# Patient Record
Sex: MALE | Race: WHITE | Employment: FULL TIME | URBAN - METROPOLITAN AREA
[De-identification: names, ages, dates, MRNs, and addresses within clinical notes are randomized per-mention and may not be internally consistent; named-entity substitution may affect disease eponyms.]

---

## 2021-10-12 ENCOUNTER — APPOINTMENT (OUTPATIENT)
Dept: GENERAL RADIOLOGY | Age: 36
End: 2021-10-12

## 2021-10-12 ENCOUNTER — HOSPITAL ENCOUNTER (EMERGENCY)
Age: 36
Discharge: HOME | End: 2021-10-12
Attending: EMERGENCY MEDICINE

## 2021-10-12 VITALS
WEIGHT: 225 LBS | DIASTOLIC BLOOD PRESSURE: 107 MMHG | RESPIRATION RATE: 16 BRPM | OXYGEN SATURATION: 98 % | BODY MASS INDEX: 30.48 KG/M2 | TEMPERATURE: 97.5 F | SYSTOLIC BLOOD PRESSURE: 154 MMHG | HEART RATE: 83 BPM | HEIGHT: 72 IN

## 2021-10-12 DIAGNOSIS — R03.0 ELEVATED BLOOD PRESSURE READING: ICD-10-CM

## 2021-10-12 DIAGNOSIS — M10.072 ACUTE IDIOPATHIC GOUT INVOLVING TOE OF LEFT FOOT: Primary | ICD-10-CM

## 2021-10-12 PROCEDURE — 6370000000 HC RX 637 (ALT 250 FOR IP): Performed by: EMERGENCY MEDICINE

## 2021-10-12 PROCEDURE — 99283 EMERGENCY DEPT VISIT LOW MDM: CPT

## 2021-10-12 PROCEDURE — 73630 X-RAY EXAM OF FOOT: CPT

## 2021-10-12 RX ORDER — NAPROXEN 500 MG/1
500 TABLET ORAL ONCE
Status: COMPLETED | OUTPATIENT
Start: 2021-10-12 | End: 2021-10-12

## 2021-10-12 RX ORDER — NAPROXEN 250 MG/1
250 TABLET ORAL 2 TIMES DAILY PRN
Qty: 20 TABLET | Refills: 0 | Status: SHIPPED | OUTPATIENT
Start: 2021-10-12

## 2021-10-12 RX ORDER — ONDANSETRON 4 MG/1
4 TABLET, ORALLY DISINTEGRATING ORAL EVERY 8 HOURS PRN
Qty: 20 TABLET | Refills: 0 | Status: SHIPPED | OUTPATIENT
Start: 2021-10-12

## 2021-10-12 RX ADMIN — NAPROXEN 500 MG: 500 TABLET ORAL at 09:10

## 2021-10-12 ASSESSMENT — PAIN SCALES - GENERAL
PAINLEVEL_OUTOF10: 8
PAINLEVEL_OUTOF10: 8

## 2021-10-12 ASSESSMENT — PAIN DESCRIPTION - PAIN TYPE: TYPE: ACUTE PAIN

## 2021-10-12 ASSESSMENT — PAIN DESCRIPTION - LOCATION: LOCATION: TOE (COMMENT WHICH ONE)

## 2021-10-12 ASSESSMENT — PAIN DESCRIPTION - ORIENTATION: ORIENTATION: LEFT

## 2021-10-12 NOTE — Clinical Note
Beverly Crooks was seen and treated in our emergency department on 10/12/2021. He may return to work on 10/13/2021. If you have any questions or concerns, please don't hesitate to call.       Nilo Black MD

## 2021-10-12 NOTE — Clinical Note
Brielle Crowe was seen and treated in our emergency department on 10/12/2021. He may return to work on 10/13/2021. If you have any questions or concerns, please don't hesitate to call.       Erica Heard MD

## 2021-10-12 NOTE — ED PROVIDER NOTES
[address] ED      CHIEF COMPLAINT  Toe Pain (Pt states left big toe pain for about 3-4 days. Pt states toe is red and swollen.)       HISTORY OF PRESENT ILLNESS  Cody Aleman is a 31 y.o. male  who presents to the ED complaining of left foot/toe swelling. Has been ongoing x4 days. Unknown if any injuries. Hard to put weight on it- painful. Mom/brother with hx gout and he thinks he has had an episode before. Walks a lot for work and is on his feet. No known fevers. No numbness, tingling, weakness. No aches/myalgias. Feels \"wore out. \"  Otherwise feels like his usual self. Has not taken anything for the pain. No other complaints, modifying factors or associated symptoms. I have reviewed the following from the nursing documentation. Past Medical History:   Diagnosis Date    Cancer ([facility]     Hodgkins disease    Lymphoma ([facility]     Lymphoma ([facility]     Pneumonia      Past Surgical History:   Procedure Laterality Date    BONE MARROW BIOPSY  11/6/12    LYMPH NODE BIOPSY  10/22/12    left neck    OTHER SURGICAL HISTORY      port-a-cath    power port insertion    OTHER SURGICAL HISTORY Left 08/23/2016    port removal     History reviewed. No pertinent family history.   Social History     Socioeconomic History    Marital status: Single     Spouse name: Not on file    Number of children: Not on file    Years of education: Not on file    Highest education level: Not on file   Occupational History    Not on file   Tobacco Use    Smoking status: Former Smoker     Packs/day: 0.10    Smokeless tobacco: Current User     Types: Snuff   Substance and Sexual Activity    Alcohol use: Yes     Comment: socially    Drug use: Yes     Types: Marijuana    Sexual activity: Yes   Other Topics Concern    Not on file   Social History Narrative    Not on file     Social Determinants of Health     Financial Resource Strain:     Difficulty of Paying Living Expenses:    Food Insecurity:     Worried About Running Out of Food in the Last Year:     Alvin of Food in the Last Year:    Transportation Needs:     Lack of Transportation (Medical):  Lack of Transportation (Non-Medical):    Physical Activity:     Days of Exercise per Week:     Minutes of Exercise per Session:    Stress:     Feeling of Stress :    Social Connections:     Frequency of Communication with Friends and Family:     Frequency of Social Gatherings with Friends and Family:     Attends Sikhism Services:     Active Member of Clubs or Organizations:     Attends Club or Organization Meetings:     Marital Status:    Intimate Partner Violence:     Fear of Current or Ex-Partner:     Emotionally Abused:     Physically Abused:     Sexually Abused:      No current facility-administered medications for this encounter. Current Outpatient Medications   Medication Sig Dispense Refill    naproxen (NAPROSYN) 250 MG tablet Take 1 tablet by mouth 2 times daily as needed for Pain Take with food 20 tablet 0    ondansetron (ZOFRAN ODT) 4 MG disintegrating tablet Take 1 tablet by mouth every 8 hours as needed for Nausea or Vomiting 20 tablet 0    albuterol sulfate HFA (VENTOLIN HFA) 108 (90 Base) MCG/ACT inhaler Inhale 2 puffs into the lungs every 6 hours as needed for Wheezing 1 Inhaler 0     Allergies   Allergen Reactions    Bee Venom     Ultram [Tramadol Hcl] Nausea Only    Aspirin Nausea And Vomiting    Ibuprofen Nausea And Vomiting    Naproxen Nausea And Vomiting       REVIEW OF SYSTEMS  10 systems reviewed, pertinent positives per HPI otherwise noted to be negative. PHYSICAL EXAM  BP (!) 154/107   Pulse 83   Temp 97.5 °F (36.4 °C) (Oral)   Resp 16   Ht 6' (1.829 m)   Wt 225 lb (102.1 kg)   SpO2 98%   BMI 30.52 kg/m²    GENERAL APPEARANCE: Awake and alert. Cooperative. No acute distress. HENT: Normocephalic. Atraumatic. Mucous membranes are moist.  No drooling or stridor. NECK: Supple.     EYES: PERRL. EOM's grossly intact. HEART/CHEST: RRR. No murmurs. 2+ DP and PT pulse in the left lower extremity. LUNGS: Respirations unlabored. CTAB. Good air exchange. Speaking comfortably in full sentences. ABDOMEN: No tenderness. Soft. Non-distended. No masses. No organomegaly. No guarding or rebound. MUSCULOSKELETAL: No extremity edema. Compartments soft. No deformity. Patient with tenderness, minimal soft tissue swelling, warmth and very faint erythema noted over the left first MTP joint. Distal motor and sensory intact. No erythema extending into the midfoot or ankle. No additional foot tenderness. No noted paronychia or evidence of fluctuance, crepitus or necrosis. .  All extremities neurovascularly intact. SKIN: Warm and dry. No acute rashes. NEUROLOGICAL: Alert and oriented. CN's 2-12 intact. No gross facial drooping. Strength 5/5, sensation intact. No gross focal deficits. PSYCHIATRIC: Normal mood and affect. LABS  I have reviewed all labs for this visit. No results found for this visit on 10/12/21. RADIOLOGY  XR FOOT LEFT (MIN 3 VIEWS)    Result Date: 10/12/2021  EXAMINATION: THREE XRAY VIEWS OF THE LEFT FOOT 10/12/2021 8:40 am COMPARISON: None. HISTORY: ORDERING SYSTEM PROVIDED HISTORY: left great toe/MTP joint pain/swelling redness. TECHNOLOGIST PROVIDED HISTORY: Reason for exam:->left great toe/MTP joint pain/swelling redness. Reason for Exam: Pain when walking for 4 days. No trauma. Acuity: Unknown Type of Exam: Initial FINDINGS: Soft tissue swelling medial to the 1st MTP joint. No fracture or destructive bone lesion. No erosion. No joint space loss. No bony or joint abnormality     Soft tissue swelling with no bony or joint abnormality demonstrated       ED COURSE/MDM  Patient seen and evaluated. Old records reviewed. imaging reviewed and results discussed with patient. Patient symptoms and history most consistent with acute gout flare.   No evidence of underlying fracture or abnormality on stable condition. Patient was given scripts for the following medications. I counseled patient how to take these medications. Discharge Medication List as of 10/12/2021  9:37 AM      START taking these medications    Details   naproxen (NAPROSYN) 250 MG tablet Take 1 tablet by mouth 2 times daily as needed for Pain Take with food, Disp-20 tablet, R-0Normal      ondansetron (ZOFRAN ODT) 4 MG disintegrating tablet Take 1 tablet by mouth every 8 hours as needed for Nausea or Vomiting, Disp-20 tablet, R-0Normal             Follow-up with:  [facility]) Pre-Services  [phone]  Schedule an appointment as soon as possible for a visit in 2 days  For recheck, To establish care with a primary care physician      DISCLAIMER: This chart was created using Dragon dictation software. Efforts were made by me to ensure accuracy, however some errors may be present due to limitations of this technology and occasionally words are not transcribed correctly.         Donald Juarez MD  10/12/21 4872

## 2021-12-08 ENCOUNTER — HOSPITAL ENCOUNTER (EMERGENCY)
Age: 36
Discharge: HOME | End: 2021-12-08
Payer: OTHER GOVERNMENT

## 2021-12-08 ENCOUNTER — APPOINTMENT (OUTPATIENT)
Dept: GENERAL RADIOLOGY | Age: 36
End: 2021-12-08
Payer: OTHER GOVERNMENT

## 2021-12-08 VITALS
HEART RATE: 94 BPM | DIASTOLIC BLOOD PRESSURE: 98 MMHG | SYSTOLIC BLOOD PRESSURE: 124 MMHG | WEIGHT: 250 LBS | BODY MASS INDEX: 33.86 KG/M2 | TEMPERATURE: 98.2 F | RESPIRATION RATE: 14 BRPM | HEIGHT: 72 IN | OXYGEN SATURATION: 98 %

## 2021-12-08 DIAGNOSIS — J12.82 PNEUMONIA DUE TO COVID-19 VIRUS: Primary | ICD-10-CM

## 2021-12-08 DIAGNOSIS — U07.1 PNEUMONIA DUE TO COVID-19 VIRUS: Primary | ICD-10-CM

## 2021-12-08 LAB
EKG ATRIAL RATE: 113 BPM
EKG DIAGNOSIS: NORMAL
EKG P AXIS: 67 DEGREES
EKG P-R INTERVAL: 130 MS
EKG Q-T INTERVAL: 342 MS
EKG QRS DURATION: 82 MS
EKG QTC CALCULATION (BAZETT): 469 MS
EKG R AXIS: 46 DEGREES
EKG T AXIS: 54 DEGREES
EKG VENTRICULAR RATE: 113 BPM
INFLUENZA A: NOT DETECTED
INFLUENZA B: NOT DETECTED
SARS-COV-2 RNA, RT PCR: DETECTED

## 2021-12-08 PROCEDURE — 93005 ELECTROCARDIOGRAM TRACING: CPT | Performed by: NURSE PRACTITIONER

## 2021-12-08 PROCEDURE — 71045 X-RAY EXAM CHEST 1 VIEW: CPT

## 2021-12-08 PROCEDURE — 87636 SARSCOV2 & INF A&B AMP PRB: CPT

## 2021-12-08 PROCEDURE — 99284 EMERGENCY DEPT VISIT MOD MDM: CPT

## 2021-12-08 PROCEDURE — 6370000000 HC RX 637 (ALT 250 FOR IP): Performed by: NURSE PRACTITIONER

## 2021-12-08 PROCEDURE — 93010 ELECTROCARDIOGRAM REPORT: CPT | Performed by: INTERNAL MEDICINE

## 2021-12-08 RX ORDER — DOXYCYCLINE HYCLATE 100 MG
100 TABLET ORAL ONCE
Status: COMPLETED | OUTPATIENT
Start: 2021-12-08 | End: 2021-12-08

## 2021-12-08 RX ORDER — BENZONATATE 100 MG/1
100 CAPSULE ORAL 3 TIMES DAILY PRN
Qty: 21 CAPSULE | Refills: 0 | Status: SHIPPED | OUTPATIENT
Start: 2021-12-08 | End: 2021-12-15

## 2021-12-08 RX ORDER — BENZONATATE 100 MG/1
100 CAPSULE ORAL ONCE
Status: COMPLETED | OUTPATIENT
Start: 2021-12-08 | End: 2021-12-08

## 2021-12-08 RX ORDER — DOXYCYCLINE HYCLATE 100 MG
100 TABLET ORAL 2 TIMES DAILY
Qty: 14 TABLET | Refills: 0 | Status: SHIPPED | OUTPATIENT
Start: 2021-12-08 | End: 2021-12-15

## 2021-12-08 RX ORDER — ALBUTEROL SULFATE 90 UG/1
AEROSOL, METERED RESPIRATORY (INHALATION)
Qty: 18 G | Refills: 1 | Status: SHIPPED | OUTPATIENT
Start: 2021-12-08

## 2021-12-08 RX ADMIN — BENZONATATE 100 MG: 100 CAPSULE ORAL at 12:39

## 2021-12-08 RX ADMIN — DOXYCYCLINE HYCLATE 100 MG: 100 TABLET, COATED ORAL at 12:39

## 2021-12-08 ASSESSMENT — PAIN SCALES - GENERAL
PAINLEVEL_OUTOF10: 9
PAINLEVEL_OUTOF10: 6

## 2021-12-08 ASSESSMENT — ENCOUNTER SYMPTOMS
COLOR CHANGE: 0
SORE THROAT: 0
COUGH: 1
SHORTNESS OF BREATH: 0
ABDOMINAL PAIN: 0
RHINORRHEA: 0

## 2021-12-08 ASSESSMENT — PAIN DESCRIPTION - LOCATION: LOCATION: GENERALIZED

## 2021-12-08 ASSESSMENT — PAIN DESCRIPTION - PAIN TYPE: TYPE: ACUTE PAIN

## 2021-12-08 NOTE — Clinical Note
Dedra Burton was seen and treated in our emergency department on 12/8/2021. He may return to work on 12/16/2021. If you have any questions or concerns, please don't hesitate to call.       Kristal Gottlieb, APRN - CNP

## 2021-12-08 NOTE — ED PROVIDER NOTES
Evaluated by [id number] [facility] Provider          [address] ED  EMERGENCY DEPARTMENT ENCOUNTER        Pt Name: Sherlyn Prescott  MRN: [id number]  Rakeshtrongfindia 1985  Dateof evaluation: 12/8/2021  Provider: BRUCE Hu CNP  PCP: No primary care provider on file. ED Attending: No att. providers found    [facility]       Chief Complaint   Patient presents with    Concern For COVID-19     chest congestion pain has been exposed to [place] at work states he has all the symptoms listed for COVID       HISTORY OF PRESENTILLNESS   (Location/Symptom, Timing/Onset, Context/Setting, Quality, Duration, Modifying Factors, Severity)  Note limiting factors. Sherlyn Prescott is a 31 y.o. male for body aches. Onset was 3 days. Context includes patient states that he was exposed to Covid at work and he has had increased body aches and a headache for the last couple of days. He denies any URI symptoms but does report a cough. He is unsure about a fever. Patient denies chest pain or shortness of breath. Alleviating factors include nothing. Aggravating factors include nothing. Pain is 9/10. Nothing has been used for pain today. Nursing Notes were all reviewed and agreed with or any disagreements were addressed  in the HPI. REVIEW OF SYSTEMS    (2-9 systems for level 4, 10 or more for level 5)     Review of Systems   Constitutional: Negative for fever. HENT: Negative for congestion, rhinorrhea and sore throat. Respiratory: Positive for cough. Negative for shortness of breath. Cardiovascular: Negative for chest pain. Gastrointestinal: Negative for abdominal pain. Genitourinary: Negative for decreased urine volume and difficulty urinating. Musculoskeletal: Negative for arthralgias and myalgias. Skin: Negative for color change and rash. Neurological: Negative for dizziness and light-headedness. Psychiatric/Behavioral: Negative for agitation.    All other systems reviewed and are negative. Positives and Pertinent negatives as per HPI. Except as noted above in the ROS, all other systems were reviewed and negative. PAST MEDICAL HISTORY     Past Medical History:   Diagnosis Date    Cancer ([facility]     Hodgkins disease    Lymphoma ([facility] [facility]     Lymphoma ([facility] [facility]     Pneumonia          SURGICAL HISTORY       Past Surgical History:   Procedure Laterality Date    BONE MARROW BIOPSY  11/6/12    LYMPH NODE BIOPSY  10/22/12    left neck    OTHER SURGICAL HISTORY      port-a-cath    power port insertion    OTHER SURGICAL HISTORY Left 08/23/2016    port removal         CURRENT MEDICATIONS       Previous Medications    NAPROXEN (NAPROSYN) 250 MG TABLET    Take 1 tablet by mouth 2 times daily as needed for Pain Take with food    ONDANSETRON (ZOFRAN ODT) 4 MG DISINTEGRATING TABLET    Take 1 tablet by mouth every 8 hours as needed for Nausea or Vomiting         ALLERGIES     Bee venom, Ultram [tramadol hcl], Aspirin, Ibuprofen, and Naproxen    FAMILY HISTORY     History reviewed. No pertinent family history. SOCIAL HISTORY       Social History     Socioeconomic History    Marital status: Single     Spouse name: None    Number of children: None    Years of education: None    Highest education level: None   Occupational History    None   Tobacco Use    Smoking status: Former Smoker     Packs/day: 0.10    Smokeless tobacco: Current User     Types: Snuff   Substance and Sexual Activity    Alcohol use: Yes     Comment: socially    Drug use: Yes     Types: Marijuana Delilah Paco)    Sexual activity: Yes   Other Topics Concern    None   Social History Narrative    None     Social Determinants of Health     Financial Resource Strain:     Difficulty of Paying Living Expenses: Not on file   Food Insecurity:     Worried About Running Out of Food in the Last Year: Not on file    Alvin of Food in the Last Year: Not on file   Transportation Needs:     Lack of Transportation (Medical):  Not on file    Lack of Transportation (Non-Medical): Not on file   Physical Activity:     Days of Exercise per Week: Not on file    Minutes of Exercise per Session: Not on file   Stress:     Feeling of Stress : Not on file   Social Connections:     Frequency of Communication with Friends and Family: Not on file    Frequency of Social Gatherings with Friends and Family: Not on file    Attends Anabaptist Services: Not on file    Active Member of [address] [place] [address] or Organizations: Not on file    Attends Club or Organization Meetings: Not on file    Marital Status: Not on file   Intimate Partner Violence:     Fear of Current or Ex-Partner: Not on file    Emotionally Abused: Not on file    Physically Abused: Not on file    Sexually Abused: Not on file   Housing Stability:     Unable to Pay for Housing in the Last Year: Not on file    Number of [place] in the Last Year: Not on file    Unstable Housing in the Last Year: Not on file       SCREENINGS    Fay Coma Scale  Eye Opening: Spontaneous  Best Verbal Response: Oriented  Best Motor Response: Obeys commands  Fay Coma Scale Score: 15        PHYSICAL EXAM  (up to 7 for level 4, 8 or more for level 5)     ED Triage Vitals [12/08/21 1026]   BP Temp Temp Source Pulse Resp SpO2 Height Weight   (!) 146/93 98.2 °F (36.8 °C) Tympanic 100 16 98 % 6' (1.829 m) 250 lb (113.4 kg)       Physical Exam  Constitutional:       Appearance: He is well-developed. HENT:      Head: Normocephalic and atraumatic. Cardiovascular:      Rate and Rhythm: Tachycardia present. Pulmonary:      Effort: Pulmonary effort is normal. No respiratory distress. Abdominal:      General: There is no distension. Palpations: Abdomen is soft. Tenderness: There is no abdominal tenderness. Musculoskeletal:         General: Normal range of motion. Cervical back: Normal range of motion. Skin:     General: Skin is warm and dry.    Neurological:      Mental Status: He is alert and oriented to person, place, and time. DIAGNOSTIC RESULTS   LABS:    Labs Reviewed   COVID-19 & INFLUENZA COMBO - Abnormal; Notable for the following components:       Result Value    SARS-CoV-2 RNA, RT PCR DETECTED (*)     All other components within normal limits    Narrative:     Paulette Osler SCED tel. [phone],  Microbiology results called to and read back by Darci Damon RN, 12/08/2021  11:05, by Milagro Prince  Performed at:  [facility],  [place], [facility]   Phone [phone]       All other labs werewithin normal range or not returned as of this dictation. EKG: All EKG's are interpreted by the Emergency Department Physician who either signs or Co-signs this chart in the absence of a cardiologist.  Please see their note for interpretation of EKG. RADIOLOGY:     Portable chest x-ray interpreted by radiologist for  Impression:    Bilateral linear pulmonary opacities suggesting COVID pneumonia in this COVID   positive patient             Interpretation per the Radiologist below, if available at the time of this note:    XR CHEST PORTABLE   Final Result   Bilateral linear pulmonary opacities suggesting COVID pneumonia in this COVID   positive patient           XR CHEST PORTABLE    Result Date: 12/8/2021  EXAMINATION: ONE XRAY VIEW OF THE CHEST 12/8/2021 11:52 am COMPARISON: 02/20/2018 HISTORY: ORDERING SYSTEM PROVIDED HISTORY: covid TECHNOLOGIST PROVIDED HISTORY: Reason for exam:->covid FINDINGS: Cardiomediastinal silhouette stable. Bilateral ill-defined linear opacities. No pneumothorax. No effusion.      Bilateral linear pulmonary opacities suggesting COVID pneumonia in this COVID positive patient         PROCEDURES   Unless otherwise noted below, none     Procedures     CRITICAL CARE TIME   N/A    CONSULTS:  None      EMERGENCYDEPARTMENT COURSE and DIFFERENTIAL DIAGNOSIS/MDM:   Vitals:    Vitals:    12/08/21 1026 12/08/21 1205 12/08/21 1213 12/08/21 1219   BP: (!) 146/93 (!) 143/104     Pulse: 100 103 97 99   Resp: 16 17 16 17   Temp: 98.2 °F (36.8 °C)      TempSrc: Tympanic      SpO2: 98% 96% 98% 97%   Weight: 250 lb (113.4 kg)      Height: 6' (1.829 m)          Patient was given the following medications:  Medications   doxycycline hyclate (VIBRA-TABS) tablet 100 mg (100 mg Oral Given 12/8/21 1239)   benzonatate (TESSALON) capsule 100 mg (100 mg Oral Given 12/8/21 1239)       Patient was seen and evaluated by myself. Patient here for concerns for generalized body aches and a cough. Patient reports he has had symptoms for the last 3 days. He reports that multiple people at work have been sick with [address]. On exam he is awake and alert. Patient did have a heart rate that was in the low 100s however it has improved into the 90s while here. Patient was able to take oral intake without difficulty. Patient did have a positive Covid his chest x-ray was concerning for Covid pneumonia. Patient will be treated with doxycycline and Tessalon Perles. He was given doses in the ED. He was given prescriptions for home use and encouraged to follow-up and establish care with PCP that was provided. Patient was able to ambulate without any desaturations, shortness of breath, or chest pain. Patient will be given instructions to return the ED for worsening symptoms. Patient was ultimately discharged with all questions answered. The patient tolerated their visit well. I have evaluated this patient. My supervising physician was available for consultation. The patient and / or the family were informed of the results of any tests, a time was given to answer questions, a plan was proposed and they agreed with plan. FINAL IMPRESSION      1.  Pneumonia due to COVID-19 virus          DISPOSITION/PLAN   DISPOSITION        PATIENT REFERRED TO:  [facility]) Pre-Services  [phone]  Schedule an appointment as soon as possible for a visit in 1 week  to establish primary care    [facility]  [address] [facility]  [phone]    If symptoms worsen      DISCHARGE MEDICATIONS:  New Prescriptions    ALBUTEROL SULFATE HFA (PROAIR HFA) 108 (90 BASE) MCG/ACT INHALER    Use 4 puffs every 4 hours while awake for 7-10 days then PRN wheezing  Dispense with SPACER and Instruct on use. May sub Ventolin or Proventil as needed per [employer].     BENZONATATE (TESSALON PERLES) 100 MG CAPSULE    Take 1 capsule by mouth 3 times daily as needed for Cough    DOXYCYCLINE HYCLATE (VIBRA-TABS) 100 MG TABLET    Take 1 tablet by mouth 2 times daily for 7 days       DISCONTINUED MEDICATIONS:  Discontinued Medications    ALBUTEROL SULFATE HFA (VENTOLIN HFA) 108 (90 BASE) MCG/ACT INHALER    Inhale 2 puffs into the lungs every 6 hours as needed for Wheezing              (Please note that portions of this note were completed with a voice recognition program.  Efforts were made to edit the dictations but occasionally words are mis-transcribed.)    BRUCE Yi CNP (electronically signed)         BRUCE Yi CNP  12/08/21 [facility] Crigler, APRN - CNP  12/08/21 [facility] [address], [address] [facility], APRN - CNP  12/08/21 1303

## 2021-12-08 NOTE — ED PROVIDER NOTES
ECG    The Ekg interpreted by me shows sinus tachycardia with a rate of 113 bpm.  Normal axis. No acute injury pattern. , QRS 82, QTc 469.      Ilsa Ladd DO  12/08/21 1812

## 2021-12-08 NOTE — Clinical Note
Nathalia Cardenas was seen and treated in our emergency department on 12/8/2021. He may return to work on 12/16/2021. If you have any questions or concerns, please don't hesitate to call.       Melecio Carbajal, APRN - CNP

## 2021-12-09 ENCOUNTER — CARE COORDINATION (OUTPATIENT)
Dept: CARE COORDINATION | Age: 36
End: 2021-12-09

## 2022-04-01 ENCOUNTER — APPOINTMENT (OUTPATIENT)
Dept: GENERAL RADIOLOGY | Age: 37
End: 2022-04-01

## 2022-04-01 ENCOUNTER — HOSPITAL ENCOUNTER (EMERGENCY)
Age: 37
Discharge: HOME | End: 2022-04-01
Attending: EMERGENCY MEDICINE

## 2022-04-01 VITALS
BODY MASS INDEX: 33.18 KG/M2 | WEIGHT: 245 LBS | HEIGHT: 72 IN | RESPIRATION RATE: 16 BRPM | HEART RATE: 100 BPM | OXYGEN SATURATION: 100 % | TEMPERATURE: 98 F | SYSTOLIC BLOOD PRESSURE: 170 MMHG | DIASTOLIC BLOOD PRESSURE: 122 MMHG

## 2022-04-01 DIAGNOSIS — M10.9 ACUTE GOUT OF LEFT FOOT, UNSPECIFIED CAUSE: Primary | ICD-10-CM

## 2022-04-01 PROCEDURE — 99283 EMERGENCY DEPT VISIT LOW MDM: CPT

## 2022-04-01 PROCEDURE — 6370000000 HC RX 637 (ALT 250 FOR IP): Performed by: EMERGENCY MEDICINE

## 2022-04-01 PROCEDURE — 73630 X-RAY EXAM OF FOOT: CPT

## 2022-04-01 RX ORDER — PREDNISONE 20 MG/1
60 TABLET ORAL ONCE
Status: COMPLETED | OUTPATIENT
Start: 2022-04-01 | End: 2022-04-01

## 2022-04-01 RX ORDER — INDOMETHACIN 50 MG/1
50 CAPSULE ORAL 3 TIMES DAILY
Qty: 21 CAPSULE | Refills: 0 | Status: SHIPPED | OUTPATIENT
Start: 2022-04-01 | End: 2022-04-08

## 2022-04-01 RX ORDER — PREDNISONE 20 MG/1
60 TABLET ORAL DAILY
Qty: 12 TABLET | Refills: 0 | Status: SHIPPED | OUTPATIENT
Start: 2022-04-01 | End: 2022-04-05

## 2022-04-01 RX ORDER — INDOMETHACIN 25 MG/1
50 CAPSULE ORAL ONCE
Status: COMPLETED | OUTPATIENT
Start: 2022-04-01 | End: 2022-04-01

## 2022-04-01 RX ADMIN — PREDNISONE 60 MG: 20 TABLET ORAL at 11:26

## 2022-04-01 RX ADMIN — INDOMETHACIN 50 MG: 25 CAPSULE ORAL at 11:26

## 2022-04-01 ASSESSMENT — ENCOUNTER SYMPTOMS
TROUBLE SWALLOWING: 0
SHORTNESS OF BREATH: 0
WHEEZING: 0
VOICE CHANGE: 0

## 2022-04-01 ASSESSMENT — PAIN - FUNCTIONAL ASSESSMENT: PAIN_FUNCTIONAL_ASSESSMENT: 0-10

## 2022-04-01 ASSESSMENT — PAIN DESCRIPTION - LOCATION: LOCATION: FOOT

## 2022-04-01 ASSESSMENT — PAIN DESCRIPTION - ORIENTATION: ORIENTATION: RIGHT

## 2022-04-01 ASSESSMENT — PAIN SCALES - GENERAL: PAINLEVEL_OUTOF10: 10

## 2022-04-01 NOTE — Clinical Note
Mackenzie Dmitriy was seen and treated in our emergency department on 4/1/2022. He may return to work on 04/04/2022. If you have any questions or concerns, please don't hesitate to call.       Ro Hutchins MD

## 2022-04-01 NOTE — ED PROVIDER NOTES
[address] ED  eMERGENCY dEPARTMENT eNCOUnter      Pt Name: Gabriela Almanzar  MRN: [id number]  [place] 1985  Date of evaluation: 4/1/2022  Provider: Yuli Lama MD    [address] [place] [address]       Chief Complaint   Patient presents with    Foot Pain     left foot; since yesterday; denies injury; has had gout in past         HISTORY OF PRESENT ILLNESS   (Location/Symptom, Timing/Onset, Context/Setting, Quality, Duration, Modifying Factors, Severity)  Note limiting factors. Gabriela Almanzar is a 31 y.o. male reports history of gout who reports left foot pain and swelling starting yesterday consistent with previous gout flares. Patient denies any fever or rapid spreading redness. Patient denies any trauma to the foot. Patient denies any history of septic arthritis or soft tissue infections requiring IV antibiotics or hospitalization. Patient reports his pain is severe constant worsening. He reports light tactile pressure such as blankets or sheets worsens his pain and nothing improves it. HPI    Nursing Notes were reviewed. REVIEW OFSYSTEMS    (2-9 systems for level 4, 10 or more for level 5)     Review of Systems   Constitutional: Negative for fever. HENT: Negative for drooling, trouble swallowing and voice change. Eyes: Negative for visual disturbance. Respiratory: Negative for shortness of breath and wheezing. Cardiovascular: Negative for chest pain and palpitations. Musculoskeletal: Positive for arthralgias. Neurological: Negative for seizures and syncope. Psychiatric/Behavioral: Negative for self-injury and suicidal ideas. Except as noted above the remainder of the review of systems was reviewed and negative.        PAST MEDICAL HISTORY     Past Medical History:   Diagnosis Date    Cancer [facility])     Hodgkins disease    [facility] [address]     Lymphoma ([facility] [address]     Pneumonia          SURGICAL HISTORY       Past Surgical History:   Procedure Laterality Date    BONE MARROW BIOPSY  11/6/12    LYMPH NODE BIOPSY  10/22/12    left neck    OTHER SURGICAL HISTORY      port-a-cath    power port insertion    OTHER SURGICAL HISTORY Left 08/23/2016    port removal         CURRENT MEDICATIONS       Previous Medications    ALBUTEROL SULFATE HFA (PROAIR HFA) 108 (90 BASE) MCG/ACT INHALER    Use 4 puffs every 4 hours while awake for 7-10 days then PRN wheezing  Dispense with SPACER and Instruct on use. May sub Ventolin or Proventil as needed per [employer]. NAPROXEN (NAPROSYN) 250 MG TABLET    Take 1 tablet by mouth 2 times daily as needed for Pain Take with food    ONDANSETRON (ZOFRAN ODT) 4 MG DISINTEGRATING TABLET    Take 1 tablet by mouth every 8 hours as needed for Nausea or Vomiting       ALLERGIES     Bee venom, Ultram [tramadol hcl], Aspirin, Ibuprofen, and Naproxen    FAMILY HISTORY     No family history on file. SOCIAL HISTORY       Social History     Socioeconomic History    Marital status: Single     Spouse name: Not on file    Number of children: Not on file    Years of education: Not on file    Highest education level: Not on file   Occupational History    Not on file   Tobacco Use    Smoking status: Former Smoker     Packs/day: 0.10    Smokeless tobacco: Current User     Types: Snuff   Vaping Use    Vaping Use: Never used   Substance and Sexual Activity    Alcohol use: Not Currently     Comment: socially    Drug use: Yes     Types: Marijuana Curlie Opal)     Comment: occ    Sexual activity: Yes   Other Topics Concern    Not on file   Social History Narrative    Not on file     Social Determinants of Health     Financial Resource Strain:     Difficulty of Paying Living Expenses: Not on file   Food Insecurity:     Worried About Running Out of Food in the Last Year: Not on file    Alvin of Food in the Last Year: Not on file   Transportation Needs:     Lack of Transportation (Medical): Not on file    Lack of Transportation (Non-Medical):  Not on file   Physical Activity:     Days of Exercise per Week: Not on file    Minutes of Exercise per Session: Not on file   Stress:     Feeling of Stress : Not on file   Social Connections:     Frequency of Communication with Friends and Family: Not on file    Frequency of Social Gatherings with Friends and Family: Not on file    Attends Congregation Services: Not on file    Active Member of [address] [place] [address] or Organizations: Not on file    Attends Club or Organization Meetings: Not on file    Marital Status: Not on file   Intimate Partner Violence:     Fear of Current or Ex-Partner: Not on file    Emotionally Abused: Not on file    Physically Abused: Not on file    Sexually Abused: Not on file   Housing Stability:     Unable to Pay for Housing in the Last Year: Not on file    Number of [place] in the Last Year: Not on file    Unstable Housing in the Last Year: Not on file         PHYSICAL EXAM    (up to 7 for level 4, 8 or more for level 5)     ED Triage Vitals   BP Temp Temp Source Pulse Resp SpO2 Height Weight   04/01/22 0932 04/01/22 0932 04/01/22 0932 04/01/22 0932 04/01/22 0932 04/01/22 0932 04/01/22 0935 04/01/22 0935   (!) 170/122 98 °F (36.7 °C) Oral 100 16 100 % 6' (1.829 m) 245 lb (111.1 kg)       Physical Exam  Vitals and nursing note reviewed. Constitutional:       General: He is not in acute distress. Appearance: He is well-developed. HENT:      Head: Normocephalic and atraumatic. Eyes:      Conjunctiva/sclera: Conjunctivae normal.   Neck:      Vascular: No JVD. Trachea: No tracheal deviation. Cardiovascular:      Rate and Rhythm: Normal rate. Pulses: Normal pulses. Comments: 2+ DP and PT pulses to left lower extremity. Normal cap refill to toes of left foot  Pulmonary:      Effort: Pulmonary effort is normal. No respiratory distress. Musculoskeletal:         General: Swelling and tenderness present.       Comments: Tenderness and swelling to left foot most prominent in left first metatarsal.  No palpable bone deformity. No red streaking or beefy red induration. Skin:     General: Skin is warm and dry. Neurological:      Mental Status: He is alert. Comments: Sensation intact all nerve distributions of left lower extremity. DIAGNOSTIC RESULTS       RADIOLOGY:     Interpretation per the Radiologist below, if available at the time of this note:    XR FOOT LEFT (MIN 3 VIEWS)   Final Result   Gouty arthritis               EMERGENCY DEPARTMENT COURSE and DIFFERENTIAL DIAGNOSIS/MDM:   Vitals:    Vitals:    04/01/22 0932 04/01/22 0935   BP: (!) 170/122    Pulse: 100    Resp: 16    Temp: 98 °F (36.7 °C)    TempSrc: Oral    SpO2: 100%    Weight:  245 lb (111.1 kg)   Height:  6' (1.829 m)         MDM  Patient with borderline tachycardia however heart rate does normalize to 90s upon my exam.  He is neurovascularly intact. History physical exam and imaging are all consistent with gouty arthritis. I feel patient is appropriate for indomethacin, prednisone, diet instructions such as staying well-hydrated and avoiding alcohol and red meats, and outpatient primary care follow-up. We discussed the possibility of infection and need for arthrocentesis and antibiotics however the patient denies previous history of infection reports his symptoms are consistent with previous gout flares. Feel he is appropriate for anti-inflammatories and standard gout treatment at this time however very strict ER return precautions are given for fever, spreading redness, worsening of symptoms. Patient expresses understanding and agreement with this plan and is discharged home. I estimate there is low risk for COMPARTMENT SYNDROME, DEEP VENOUS THROMBOSIS, SEPTIC ARTHRITIS, TENDON OR NEUROVASCULAR INJURY, thus I consider the discharge disposition reasonable.  The patient and I have discussed the diagnosis and risks, and we agree with discharging home to follow-up with their primary doctor or the referral orthopedist. We also discussed returning to the Emergency Department immediately if new or worsening symptoms occur. We have discussed the symptoms which are most concerning (e.g., changing or worsening pain, numbness, weakness) that necessitate immediate return       Procedures    FINAL IMPRESSION      1. Acute gout of left foot, unspecified cause          DISPOSITION/PLAN   DISPOSITION Decision To Discharge 04/01/2022 11:12:10 AM      PATIENT REFERRED TO:  Migel Tinoco  [phone]  In 3 days  Ask for an appointment with a primary care doctor    [facility] [place] [facility]  [address] [facility]  [phone]    If symptoms worsen      DISCHARGE MEDICATIONS:  New Prescriptions    INDOMETHACIN (INDOCIN) 50 MG CAPSULE    Take 1 capsule by mouth 3 times daily for 7 days    PREDNISONE (DELTASONE) 20 MG TABLET    Take 3 tablets by mouth daily for 4 days          (Please note that portions of this note were completed with a voice recognition program.  Efforts were made to edit the dictations but occasionally words aremis-transcribed. )    Jason Flores MD (electronically signed)  Attending Emergency Physician           Jason Flores MD  04/01/22 6537

## 2022-09-12 ENCOUNTER — APPOINTMENT (OUTPATIENT)
Dept: GENERAL RADIOLOGY | Age: 37
End: 2022-09-12

## 2022-09-12 ENCOUNTER — HOSPITAL ENCOUNTER (EMERGENCY)
Age: 37
Discharge: HOME | End: 2022-09-12
Attending: STUDENT IN AN ORGANIZED HEALTH CARE EDUCATION/TRAINING PROGRAM

## 2022-09-12 VITALS
TEMPERATURE: 97.4 F | RESPIRATION RATE: 16 BRPM | BODY MASS INDEX: 33.86 KG/M2 | WEIGHT: 250 LBS | DIASTOLIC BLOOD PRESSURE: 88 MMHG | OXYGEN SATURATION: 98 % | HEIGHT: 72 IN | HEART RATE: 80 BPM | SYSTOLIC BLOOD PRESSURE: 149 MMHG

## 2022-09-12 DIAGNOSIS — M79.671 RIGHT FOOT PAIN: ICD-10-CM

## 2022-09-12 DIAGNOSIS — S93.401A SPRAIN OF RIGHT ANKLE, UNSPECIFIED LIGAMENT, INITIAL ENCOUNTER: Primary | ICD-10-CM

## 2022-09-12 PROCEDURE — 73630 X-RAY EXAM OF FOOT: CPT

## 2022-09-12 PROCEDURE — 99283 EMERGENCY DEPT VISIT LOW MDM: CPT

## 2022-09-12 PROCEDURE — 73610 X-RAY EXAM OF ANKLE: CPT

## 2022-09-12 PROCEDURE — 6370000000 HC RX 637 (ALT 250 FOR IP): Performed by: STUDENT IN AN ORGANIZED HEALTH CARE EDUCATION/TRAINING PROGRAM

## 2022-09-12 RX ORDER — ACETAMINOPHEN 500 MG
1000 TABLET ORAL ONCE
Status: COMPLETED | OUTPATIENT
Start: 2022-09-12 | End: 2022-09-12

## 2022-09-12 RX ORDER — LIDOCAINE 4 G/G
1 PATCH TOPICAL ONCE
Status: DISCONTINUED | OUTPATIENT
Start: 2022-09-12 | End: 2022-09-12 | Stop reason: HOSPADM

## 2022-09-12 RX ADMIN — ACETAMINOPHEN 1000 MG: 500 TABLET ORAL at 09:11

## 2022-09-12 ASSESSMENT — PAIN - FUNCTIONAL ASSESSMENT: PAIN_FUNCTIONAL_ASSESSMENT: 0-10

## 2022-09-12 ASSESSMENT — PAIN DESCRIPTION - ORIENTATION: ORIENTATION: RIGHT

## 2022-09-12 ASSESSMENT — PAIN SCALES - GENERAL
PAINLEVEL_OUTOF10: 7
PAINLEVEL_OUTOF10: 5

## 2022-09-12 ASSESSMENT — PAIN DESCRIPTION - LOCATION: LOCATION: ANKLE

## 2022-09-12 NOTE — ED PROVIDER NOTES
[address] ED      CHIEF COMPLAINT  Foot and ankle injury       HISTORY OF PRESENT ILLNESS  Arlet Vargas is a 30 y.o. male with a past medical history of gout, Hodgkin's lymphoma, who presents to the ED complaining of right foot pain. Jame Hammer complains of injury to the right foot, that occurred 6 day(s) ago, after mechanical fall (slipped on wet ground). The pain is moderate and constant, sharp, worse with walking, improves with aleve and soaking in hot water, and does not radiate. Other injuries, abrasions and lacerations are Absent. Lower extremity edema, coolness, and weakness are Absent. Numbness and tingling are Absent. No head injury, no LOC, not on blood thinners, no vomiting. Denies other injuries. Old records reviewed:  Has been seen for gout before- this feels different    No other complaints, modifying factors or associated symptoms. I have reviewed the following from the nursing documentation. Past Medical History:   Diagnosis Date    Cancer ([facility] [address]     Hodgkins disease    Lymphoma ([facility] [address]     Lymphoma ([facility] [address]     Pneumonia      Past Surgical History:   Procedure Laterality Date    BONE MARROW BIOPSY  11/6/12    LYMPH NODE BIOPSY  10/22/12    left neck    OTHER SURGICAL HISTORY      port-a-cath    power port insertion    OTHER SURGICAL HISTORY Left 08/23/2016    port removal     History reviewed. No pertinent family history.   Social History     Socioeconomic History    Marital status: Single     Spouse name: Not on file    Number of children: Not on file    Years of education: Not on file    Highest education level: Not on file   Occupational History    Not on file   Tobacco Use    Smoking status: Former     Packs/day: 0.10     Types: Cigarettes    Smokeless tobacco: Current     Types: Snuff   Vaping Use    Vaping Use: Never used   Substance and Sexual Activity    Alcohol use: Not Currently     Comment: socially    Drug use: Yes     Types: Marijuana Alison Kailash) Comment: occ    Sexual activity: Yes   Other Topics Concern    Not on file   Social History Narrative    Not on file     Social Determinants of Health     Financial Resource Strain: Not on file   Food Insecurity: Not on file   Transportation Needs: Not on file   Physical Activity: Not on file   Stress: Not on file   Social Connections: Not on file   Intimate Partner Violence: Not on file   Housing Stability: Not on file     Current Facility-Administered Medications   Medication Dose Route Frequency Provider Last Rate Last Admin    lidocaine 4 % external patch 1 patch  1 patch TransDERmal Once Joyce Jacobs MD   1 patch at 09/12/22 0911     Current Outpatient Medications   Medication Sig Dispense Refill    indomethacin (INDOCIN) 50 MG capsule Take 1 capsule by mouth 3 times daily for 7 days 21 capsule 0    albuterol sulfate HFA (PROAIR HFA) 108 (90 Base) MCG/ACT inhaler Use 4 puffs every 4 hours while awake for 7-10 days then PRN wheezing  Dispense with SPACER and Instruct on use. May sub Ventolin or Proventil as needed per [employer]. 18 g 1    naproxen (NAPROSYN) 250 MG tablet Take 1 tablet by mouth 2 times daily as needed for Pain Take with food 20 tablet 0    ondansetron (ZOFRAN ODT) 4 MG disintegrating tablet Take 1 tablet by mouth every 8 hours as needed for Nausea or Vomiting 20 tablet 0     Allergies   Allergen Reactions    Bee Venom     Ultram [Tramadol Hcl] Nausea Only    Aspirin Nausea And Vomiting    Ibuprofen Nausea And Vomiting    Naproxen Nausea And Vomiting       REVIEW OF SYSTEMS  All systems reviewed, pertinent positives per HPI otherwise noted to be negative. PHYSICAL EXAM  BP (!) 155/92   Pulse 83   Temp 97.5 °F (36.4 °C) (Oral)   Resp 18   Ht 6' (1.829 m)   Wt 250 lb (113.4 kg)   SpO2 98%   BMI 33.91 kg/m²    GENERAL APPEARANCE: Awake and alert. Cooperative. no distress. HENT: Normocephalic. Atraumatic.  Mucous membranes are moist.  No septal hematoma, blood in the oropharynx, or hemotympanums  NECK: Supple. No cervical spine tenderness to palpation  EYES: PERRL. EOM's grossly intact. HEART/CHEST: RRR. No murmurs. LUNGS: Respirations unlabored. CTAB. Good air exchange. Speaking comfortably in full sentences. ABDOMEN: No tenderness. Soft. Non-distended. No masses. No organomegaly. No guarding or rebound. MUSCULOSKELETAL: right Medial malleolus is not tender to palpation. Lateral malleolus is  tender to palpation. Top of foot is tender to palpation. 5th metatarsal head is not tender to palpation. Proximal fibula is not tender to palpation. Patella is not tender to palpation. The calves are not tender to palpation. Active range of motion of the joints without ligamentous laxity. There is no obvious joint or bony deformity. negative joint effusions. NEUROLOGICAL: Awake, alert and oriented x 3. Power intact at the knees, ankles, and toes. Sensation is intact to light touch in the lower extremities. IMMUNOLOGICAL: No palpable lymphadenopathy or lymphatic streaking. DERMATOLOGIC: No petechiae or rashes. There are not ecchymoses. Theres no cyanosis, erythema, or pallor. There is no edema. Skin temperature is normal. No lacerations or abrasions. No evidence of foreign bodies. PSYCHIATRIC: Normal mood and affect. LABS  I have reviewed all labs for this visit. No results found for this visit on 09/12/22. RADIOLOGY  The following were independently interpreted by me and on call radiologist: Extremities: Foot: Negative  Ankle: no abnormality. If performed, all other non-plain film images (e.g., CT Scan, Ultrasound) have been interpreted by the on-call radiologist.    XR ANKLE RIGHT (MIN 3 VIEWS)   Final Result   Possible ankle sprains. RECOMMENDATION:   Noncontrast ankle MRI if symptoms persist.         XR FOOT RIGHT (MIN 3 VIEWS)   Final Result   No acute findings. ED COURSE / MDM  Patient seen and evaluated.  Old records reviewed and pertinent information included in HPI. Labs and imaging reviewed and results discussed with patient. Overall well appearing patient, in no acute distress, presenting for right foot and ankle injury after mechanical fall. Physical exam remarkable for tenderness to palpation of the right ankle and foot. Differential diagnosis includes but is not limited to: Fracture, muscular strain, ligamentous sprain, joint effusion, lower suspicion for gout, septic arthritis    Workup showed:    ED Course as of 09/12/22 0950   Mon Sep 12, 2022   0920 XR R ankle: IMPRESSION:  Possible ankle sprains. [ER]   9281 X-ray of right foot shows no evidence of fracture or dislocation [ER]      ED Course User Index  [ER] Uriel Kaplan MD      Is this patient to be included in the SEP-1 Core Measure due to severe sepsis or septic shock? No   Exclusion criteria - the patient is NOT to be included for SEP-1 Core Measure due to: Infection is not suspected    During the patient's ED course, the patient was given:  Medications   lidocaine 4 % external patch 1 patch (1 patch TransDERmal Patch Applied 9/12/22 0911)   acetaminophen (TYLENOL) tablet 1,000 mg (1,000 mg Oral Given 9/12/22 0911)      X-ray showed evidence of possible ankle sprains. No evidence of fracture or dislocation in the foot or ankle. Patient desires to continue using Ace wrap and crutches from home. Patient given referral to orthopedics. Compartments are soft, low suspicion for compartment syndrome. History and exam not consistent with DVT. No evidence of neurovascular injury on exam. No warmth and erythema over the joint. No swelling. No pain with micro-movements. Low suspicion for septic arthritis or gout. Work-up overall reassuring. At this time, feel the patient is appropriate for discharge to follow-up with a primary care doctor. Patient feels comfortable with discharge at this time. Return precautions given.   Encouraged PCP follow-up in 2 days, orthopedic follow-up as soon as possible. Patient discharged in stable condition. I estimate there is LOW risk for COMPARTMENT SYNDROME, DEEP VENOUS THROMBOSIS, TENDON OR NEUROVASCULAR INJURY, thus I consider the discharge disposition reasonable. Gato Wren and I have discussed the diagnosis and risks, and we agree with discharging home to follow-up with their primary doctor or the referral orthopedist. We also discussed returning to the Emergency Department immediately if new or worsening symptoms occur. We have discussed the symptoms which are most concerning (e.g., changing or worsening pain, numbness, weakness) that necessitate immediate return. CLINICAL IMPRESSION  1. Sprain of right ankle, unspecified ligament, initial encounter    2. Right foot pain        Blood pressure (!) 149/88, pulse 80, temperature 97.4 °F (36.3 °C), resp. rate 16, height 6' (1.829 m), weight 250 lb (113.4 kg), SpO2 98 %. Felix Lopez was discharged to home in stable condition. Patient was given scripts for the following medications. I counseled patient how to take these medications. Discharge Medication List as of 9/12/2022  9:37 AM          Follow-up with:  Sebastian Carter MD  [place] [address]  [phone]    Schedule an appointment as soon as possible for a visit       [facility]) Pre-Services  [phone]  Schedule an appointment as soon as possible for a visit       [facility] [facility]  [address] [facility]  [phone]  Go to   As needed, If symptoms worsen    DISCLAIMER: This chart was created using Dragon dictation software. Efforts were made by me to ensure accuracy, however some errors may be present due to limitations of this technology and occasionally words are not transcribed correctly.         Hardik Rueda MD  09/12/22 0916

## 2022-09-12 NOTE — Clinical Note
Lisa Alysha was seen and treated in our emergency department on 9/12/2022. He may return to work on 09/15/2022. If you have any questions or concerns, please don't hesitate to call.       Bindu Smith MD

## 2023-03-21 ENCOUNTER — HOSPITAL ENCOUNTER (EMERGENCY)
Age: 38
Discharge: HOME | End: 2023-03-21
Attending: STUDENT IN AN ORGANIZED HEALTH CARE EDUCATION/TRAINING PROGRAM
Payer: COMMERCIAL

## 2023-03-21 ENCOUNTER — APPOINTMENT (OUTPATIENT)
Dept: GENERAL RADIOLOGY | Age: 38
End: 2023-03-21
Payer: COMMERCIAL

## 2023-03-21 VITALS
WEIGHT: 250 LBS | HEIGHT: 72 IN | DIASTOLIC BLOOD PRESSURE: 104 MMHG | TEMPERATURE: 97.7 F | RESPIRATION RATE: 16 BRPM | BODY MASS INDEX: 33.86 KG/M2 | OXYGEN SATURATION: 97 % | HEART RATE: 91 BPM | SYSTOLIC BLOOD PRESSURE: 151 MMHG

## 2023-03-21 DIAGNOSIS — R03.0 ELEVATED BLOOD PRESSURE READING: ICD-10-CM

## 2023-03-21 DIAGNOSIS — M25.561 ACUTE PAIN OF RIGHT KNEE: ICD-10-CM

## 2023-03-21 DIAGNOSIS — M79.671 ACUTE PAIN OF RIGHT FOOT: ICD-10-CM

## 2023-03-21 DIAGNOSIS — M25.571 ACUTE RIGHT ANKLE PAIN: Primary | ICD-10-CM

## 2023-03-21 PROCEDURE — 73610 X-RAY EXAM OF ANKLE: CPT

## 2023-03-21 PROCEDURE — 96372 THER/PROPH/DIAG INJ SC/IM: CPT

## 2023-03-21 PROCEDURE — 6360000002 HC RX W HCPCS: Performed by: STUDENT IN AN ORGANIZED HEALTH CARE EDUCATION/TRAINING PROGRAM

## 2023-03-21 PROCEDURE — 73562 X-RAY EXAM OF KNEE 3: CPT

## 2023-03-21 PROCEDURE — 99284 EMERGENCY DEPT VISIT MOD MDM: CPT

## 2023-03-21 PROCEDURE — 73630 X-RAY EXAM OF FOOT: CPT

## 2023-03-21 PROCEDURE — 6370000000 HC RX 637 (ALT 250 FOR IP): Performed by: STUDENT IN AN ORGANIZED HEALTH CARE EDUCATION/TRAINING PROGRAM

## 2023-03-21 RX ORDER — KETOROLAC TROMETHAMINE 30 MG/ML
30 INJECTION, SOLUTION INTRAMUSCULAR; INTRAVENOUS ONCE
Status: COMPLETED | OUTPATIENT
Start: 2023-03-21 | End: 2023-03-21

## 2023-03-21 RX ORDER — ACETAMINOPHEN 500 MG
1000 TABLET ORAL ONCE
Status: COMPLETED | OUTPATIENT
Start: 2023-03-21 | End: 2023-03-21

## 2023-03-21 RX ORDER — ALLOPURINOL 100 MG/1
100 TABLET ORAL DAILY
COMMUNITY

## 2023-03-21 RX ORDER — NAPROXEN 500 MG/1
500 TABLET ORAL 2 TIMES DAILY WITH MEALS
Qty: 20 TABLET | Refills: 0 | Status: SHIPPED | OUTPATIENT
Start: 2023-03-21 | End: 2023-03-31

## 2023-03-21 RX ADMIN — ACETAMINOPHEN 1000 MG: 500 TABLET ORAL at 11:25

## 2023-03-21 RX ADMIN — KETOROLAC TROMETHAMINE 30 MG: 30 INJECTION, SOLUTION INTRAMUSCULAR at 11:25

## 2023-03-21 ASSESSMENT — PAIN SCALES - GENERAL
PAINLEVEL_OUTOF10: 8
PAINLEVEL_OUTOF10: 10

## 2023-03-21 ASSESSMENT — PAIN - FUNCTIONAL ASSESSMENT: PAIN_FUNCTIONAL_ASSESSMENT: 0-10

## 2023-03-21 NOTE — ED PROVIDER NOTES
INTERPRETATION:  ED Course as of 03/21/23 1202   Tue Mar 21, 2023   1149 Imaging of the right knee, ankle, and foot on my interpretation shows no evidence of fracture or dislocation. [ER]   1158 X-ray right foot, right ankle, and right knee: IMPRESSION:  Questionable mild soft tissue swelling of the medial ankle. No skeletal abnormality in the right knee, ankle or foot. [ER]      ED Course User Index  [ER] Debo Escobar MD        SCREENINGS:       Fay Coma Scale  Eye Opening: Spontaneous  Best Verbal Response: Oriented  Best Motor Response: Obeys commands  Fay Coma Scale Score: 15                CIWA Assessment  BP: (!) 189/108  Heart Rate: 95           Is this patient to be included in the SEP-1 Core Measure due to severe sepsis or septic shock? No   Exclusion criteria - the patient is NOT to be included for SEP-1 Core Measure due to:  2+ SIRS criteria are not met      TREATMENT:  During the patient's ED course, the patient was given:  Medications   ketorolac (TORADOL) injection 30 mg (30 mg IntraMUSCular Given 3/21/23 1125)   acetaminophen (TYLENOL) tablet 1,000 mg (1,000 mg Oral Given 3/21/23 1125)        Patient was given scripts for the following medications. I counseled patient how to take these medications. New Prescriptions    NAPROXEN (NAPROSYN) 500 MG TABLET    Take 1 tablet by mouth 2 times daily (with meals) for 10 days     REASSESSMENT:  On reassessment, patient reports mild improvement with treatment in the emergency department. X-ray shows no evidence of fracture or dislocation. Ace wrap applied. Patient encouraged to continue to use his crutches if it is painful to weight-bear. Patient will follow-up with orthopedics. Compartments are soft, low suspicion for compartment syndrome. History and exam not consistent with DVT. No evidence of neurovascular injury on exam. No warmth and erythema over the joint. No swelling. No pain with micro-movements.  Low suspicion for septic arthritis or

## 2023-03-21 NOTE — Clinical Note
Fly Lewis was seen and treated in our emergency department on 3/21/2023. He may return to work on 03/23/2023. If you have any questions or concerns, please don't hesitate to call.       Myriam Cardozo MD